# Patient Record
Sex: FEMALE | Race: WHITE | NOT HISPANIC OR LATINO | Employment: FULL TIME | URBAN - METROPOLITAN AREA
[De-identification: names, ages, dates, MRNs, and addresses within clinical notes are randomized per-mention and may not be internally consistent; named-entity substitution may affect disease eponyms.]

---

## 2017-01-24 ENCOUNTER — OFFICE VISIT (OUTPATIENT)
Dept: ORTHOPEDICS | Facility: CLINIC | Age: 37
End: 2017-01-24
Payer: COMMERCIAL

## 2017-01-24 ENCOUNTER — HOSPITAL ENCOUNTER (OUTPATIENT)
Dept: RADIOLOGY | Facility: HOSPITAL | Age: 37
Discharge: HOME | End: 2017-01-24
Attending: ORTHOPAEDIC SURGERY
Payer: COMMERCIAL

## 2017-01-24 VITALS
BODY MASS INDEX: 33.59 KG/M2 | HEART RATE: 88 BPM | DIASTOLIC BLOOD PRESSURE: 56 MMHG | HEIGHT: 67 IN | WEIGHT: 214 LBS | SYSTOLIC BLOOD PRESSURE: 114 MMHG

## 2017-01-24 DIAGNOSIS — M25.572 LEFT ANKLE PAIN, UNSPECIFIED CHRONICITY: ICD-10-CM

## 2017-01-24 DIAGNOSIS — S93.432A SPRAIN OF TIBIOFIBULAR LIGAMENT OF LEFT ANKLE, INITIAL ENCOUNTER: Primary | ICD-10-CM

## 2017-01-24 DIAGNOSIS — M25.572 LEFT ANKLE PAIN, UNSPECIFIED CHRONICITY: Primary | ICD-10-CM

## 2017-01-24 PROCEDURE — 99203 OFFICE O/P NEW LOW 30 MIN: CPT | Mod: S$GLB,,, | Performed by: ORTHOPAEDIC SURGERY

## 2017-01-24 PROCEDURE — 99999 PR PBB SHADOW E&M-EST. PATIENT-LVL III: CPT | Mod: PBBFAC,,, | Performed by: ORTHOPAEDIC SURGERY

## 2017-01-24 PROCEDURE — 73610 X-RAY EXAM OF ANKLE: CPT | Mod: TC,PN,LT

## 2017-01-24 PROCEDURE — 1159F MED LIST DOCD IN RCRD: CPT | Mod: S$GLB,,, | Performed by: ORTHOPAEDIC SURGERY

## 2017-01-24 PROCEDURE — 73610 X-RAY EXAM OF ANKLE: CPT | Mod: 26,LT,, | Performed by: RADIOLOGY

## 2017-01-24 NOTE — PROGRESS NOTES
Past Medical History   Diagnosis Date    Allergy        Past Surgical History   Procedure Laterality Date    Tonsillectomy      Knee arthroscopy Right        Current Outpatient Prescriptions   Medication Sig    naltrexone-bupropion (CONTRAVE) 8-90 mg TbSR 1 tab po q am x1 week, then 1 tab po bid x 1 week, then 2 tabs po q am and 1 po q pm x 1 week, then 2 tabs po bid.    norgestimate-ethinyl estradiol (ORTHO-CYCLEN) 0.25-35 mg-mcg per tablet Take 1 tablet by mouth once daily.     No current facility-administered medications for this visit.        Review of patient's allergies indicates:   Allergen Reactions    Ceclor [cefaclor] Hives       Family History   Problem Relation Age of Onset    Collagen disease Neg Hx     Early death Mother     Heart disease Mother     Hypertension Mother     Hyperlipidemia Mother     Diabetes Mother        Social History     Social History    Marital status: Single     Spouse name: N/A    Number of children: N/A    Years of education: N/A     Occupational History    Not on file.     Social History Main Topics    Smoking status: Current Every Day Smoker    Smokeless tobacco: Never Used    Alcohol use No    Drug use: No    Sexual activity: Not on file     Other Topics Concern    Not on file     Social History Narrative       Chief Complaint:   Chief Complaint   Patient presents with    Ankle Pain     left ankle injury       Consulting Physician: Referral, Self    History of present illness:    This is a 36 y.o. year old female who complains of left ankle pain following a twisting injury after stepping in a hole on 1/21/17.  She reports that her pain is a 5 out of 10 and worse when walking or standing.  She localizes it to the lateral side of her ankle.  She is in normal shoewear today.  She is weightbearing.    Review of Systems:    Constitution: Denies chills, fever, and sweats.  HENT: Denies headaches or blurry vision.  Cardiovascular: Denies chest pain or  "irregular heart beat.  Respiratory: Denies cough or shortness of breath.  Gastrointestinal: Denies abdominal pain, nausea, or vomiting.  Musculoskeletal:  Denies muscle cramps.  Neurological: Denies dizziness or focal weakness.  Psychiatric/Behavioral: Normal mental status.  Hematologic/Lymphatic: Denies bleeding problem or easy bruising/bleeding.  Skin: Denies rash or suspicious lesions.    Examination:    Vital Signs:    Vitals:    01/24/17 1506   BP: (!) 114/56   Pulse: 88   Weight: 97.1 kg (214 lb)   Height: 5' 7" (1.702 m)   PainSc:   5   PainLoc: Ankle       Body mass index is 33.52 kg/(m^2).    This a well-developed, well nourished patient in no acute distress.    Alert and oriented and cooperative to examination.       Physical Exam: Left Ankle Exam     Gait:   Antalgic    Skin  Scars:   None  Rashes:  None    Inspection   Deformity:   None  Erythema:   Mild  Bruising:   Diffuse  Swelling:   Moderate  Lymphadenopathy: None    Instability:  Not tested due to injury    Range of Motion Limited due to pain    Muscle Strength   Strength:  Not tested    Tests   Anterior drawer:  Stable  Varus tilt:  Not tested due to injury    Other   Ankle Crepitus:  Not tested due to injury  Sensation:   Normal  Achilles:  Normal  Forefoot:  Normal  Tenderness:  Medial and lateral malleoli    Vascular Exam   Dorsalis Pedis:       Palpable  Capillary refill:    Normal          Imaging: X-rays ordered and reviewed today of the left ankle reveal no acute bony abnormality, fracture or dislocation.        Assessment: Sprain of tibiofibular ligament of left ankle, initial encounter        Plan:  She has a severe strain of her left ankle.  We'll place her into a boot today and allow her to weight-bear in the boot.  We will also instructed her on icing and elevation.  We like to reevaluate her in 3 weeks with new x-rays.      DISCLAIMER: This note may have been dictated using voice recognition software and may contain grammatical " errors.     NOTE: Consult report sent to referring provider via [employer] EMR.

## 2017-01-24 NOTE — MR AVS SNAPSHOT
[facility]  [address] [place] [id number]  Phone: [phone]                  Lucia Goodwin   2017 3:00 PM   Office Visit    Description:  Female : 1980   Provider:  Oleg Ewing MD   Department:  [facility]           Reason for Visit     Ankle Pain           Diagnoses this Visit        Comments    Sprain of tibiofibular ligament of left ankle, initial encounter    -  Primary            To Do List           Future Appointments        Provider Department Dept Phone    2017 3:15 PM Oleg Ewing MD [facility] [phone]      Goals (5 Years of Data)     None      [facility]     [facility] -  Assistance  Registered nurses in the [facility] Center provide clinical advisement, health education, appointment booking, and other advisory services.  Call for this free service at [phone].             Medications           Message regarding Medications     Verify the changes and/or additions to your medication regime listed below are the same as discussed with your clinician today.  If any of these changes or additions are incorrect, please notify your healthcare provider.             Verify that the below list of medications is an accurate representation of the medications you are currently taking.  If none reported, the list may be blank. If incorrect, please contact your healthcare provider. Carry this list with you in case of emergency.           Current Medications     naltrexone-bupropion (CONTRAVE) 8-90 mg TbSR 1 tab po q am x1 week, then 1 tab po bid x 1 week, then 2 tabs po q am and 1 po q pm x 1 week, then 2 tabs po bid.    norgestimate-ethinyl estradiol (ORTHO-CYCLEN) 0.25-35 mg-mcg per tablet Take 1 tablet by mouth once daily.           Clinical Reference Information           Vital Signs - Last Recorded  Most recent update: 2017  3:28 PM by Krysta Richards  "LPN    BP Pulse Ht Wt BMI    (!) 114/56 88 5' 7" (1.702 m) 97.1 kg (214 lb) 33.52 kg/m2      Blood Pressure          Most Recent Value    BP  (!)  114/56      Allergies as of 1/24/2017     Ceclor [Cefaclor]      Immunizations Administered on Date of Encounter - 1/24/2017     None      Smoking Cessation     If you would like to quit smoking:   You may be eligible for free services if you are a [place] resident and started smoking cigarettes before September 1, 1988.  Call the [employer]oking Cessation [employer] ([employer]) toll free at [phone] or ([phone].   Call 4-209-QUIT-NOW if you do not meet the above criteria.            "

## 2018-03-21 DIAGNOSIS — M25.561 RIGHT KNEE PAIN, UNSPECIFIED CHRONICITY: Primary | ICD-10-CM

## 2025-02-15 ENCOUNTER — HOSPITAL ENCOUNTER (EMERGENCY)
Facility: HOSPITAL | Age: 45
Discharge: HOME | End: 2025-02-15
Attending: EMERGENCY MEDICINE

## 2025-02-15 VITALS
HEART RATE: 70 BPM | DIASTOLIC BLOOD PRESSURE: 70 MMHG | WEIGHT: 214 LBS | BODY MASS INDEX: 33.59 KG/M2 | SYSTOLIC BLOOD PRESSURE: 110 MMHG | OXYGEN SATURATION: 100 % | RESPIRATION RATE: 16 BRPM | HEIGHT: 67 IN | TEMPERATURE: 99 F

## 2025-02-15 DIAGNOSIS — S09.90XA CLOSED HEAD INJURY, INITIAL ENCOUNTER: ICD-10-CM
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
## 2025-02-15 DIAGNOSIS — S00.81XA ABRASION OF FACE, INITIAL ENCOUNTER: ICD-10-CM

## 2025-02-15 DIAGNOSIS — S00.83XA CONTUSION OF FACE, INITIAL ENCOUNTER: ICD-10-CM

## 2025-02-15 LAB
B-HCG UR QL: NEGATIVE
CTP QC/QA: YES

## 2025-02-15 PROCEDURE — 99284 EMERGENCY DEPT VISIT MOD MDM: CPT | Mod: 25

## 2025-02-15 PROCEDURE — 25000003 PHARM REV CODE 250: Performed by: EMERGENCY MEDICINE

## 2025-02-15 PROCEDURE — 81025 URINE PREGNANCY TEST: CPT | Performed by: EMERGENCY MEDICINE

## 2025-02-15 PROCEDURE — 12011 RPR F/E/E/N/L/M 2.5 CM/<: CPT

## 2025-02-15 RX ORDER — ACETAMINOPHEN 500 MG
1000 TABLET ORAL
Status: COMPLETED | OUTPATIENT
Start: 2025-02-15 | End: 2025-02-15

## 2025-02-15 RX ADMIN — ACETAMINOPHEN 1000 MG: 500 TABLET, FILM COATED ORAL at 02:02

## 2025-02-15 NOTE — ED PROVIDER NOTES
Encounter Date: 2/15/2025       History     Chief Complaint   Patient presents with    Fall     Fell down approx 5 steps on [employer] and hit head. -LOC. +ETOH.     Head Injury     HPI  Patient presents to ED by ambulance after a fall while riding on [employer] this evening.  + ETOH.  Patient was on the upper deck and coming down the steps to the lower level.  States she has a bad ankle from old injury that was surgically repaired and the ankle sometimes gives out, which is what happened as she was coming down the steps and then she fell forward striking her head/face.  She denies loss of consciousness.  She complains of pain to her head and face.  She denies any other associated injuries or recent illness.   Review of patient's allergies indicates:   Allergen Reactions    Ceclor [cefaclor] Hives     Past Medical History:   Diagnosis Date    Allergy      Past Surgical History:   Procedure Laterality Date    KNEE ARTHROSCOPY Right     TONSILLECTOMY       Family History   Problem Relation Name Age of Onset    Collagen disease Neg Hx      Early death Mother      Heart disease Mother      Hypertension Mother      Hyperlipidemia Mother      Diabetes Mother       Social History[1]  Review of Systems   Constitutional:  Negative for fever.   HENT:  Negative for sore throat.    Respiratory:  Negative for shortness of breath.    Cardiovascular:  Negative for chest pain.   Gastrointestinal:  Negative for nausea.   Genitourinary:  Negative for dysuria.   Musculoskeletal:  Negative for back pain.   Skin:  Positive for wound. Negative for rash.   Neurological:  Positive for headaches. Negative for weakness.   Hematological:  Does not bruise/bleed easily.       Physical Exam     Initial Vitals [02/15/25 0046]   BP Pulse Resp Temp SpO2   115/67 80 18 98.5 °F (36.9 °C) 100 %      MAP       --         Physical Exam    Nursing note and vitals reviewed.  Constitutional: She appears well-developed and well-nourished. No  distress.   HENT:   Head: Normocephalic. Mouth/Throat: Oropharynx is clear and moist.   Abrasion to the central portion of the forehead at the hairline.  Multiple facial contusions noted to the forehead as well as the right maxillary region.  There are abrasions to the right face with a superficial linear laceration to the cheek.   Eyes: EOM are normal. Pupils are equal, round, and reactive to light.   Neck:   Collar in place.  No palpable midline tenderness or deformities.   Cardiovascular:  Normal rate and regular rhythm.           Pulmonary/Chest: Breath sounds normal. No respiratory distress. She exhibits no tenderness.   Abdominal: Abdomen is soft. There is no abdominal tenderness. There is no rebound and no guarding.   Musculoskeletal:         General: No tenderness or edema. Normal range of motion.     Neurological: She is alert and oriented to person, place, and time. She has normal strength. No cranial nerve deficit. GCS score is 15. GCS eye subscore is 4. GCS verbal subscore is 5. GCS motor subscore is 6.   Skin: Skin is warm and dry. Capillary refill takes less than 2 seconds. No rash noted.   Psychiatric: She has a normal mood and affect. Thought content normal.         ED Course   Lac Repair    Date/Time: 2/15/2025 3:00 AM    Performed by: Maryjo Portillo MD  Authorized by: Maryjo Portillo MD    Consent:     Consent obtained:  Verbal    Consent given by:  Patient    Risks, benefits, and alternatives were discussed: yes      Risks discussed:  Poor cosmetic result, infection and need for additional repair    Alternatives discussed:  No treatment  Anesthesia:     Anesthesia method:  None  Laceration details:     Location:  Face    Face location:  R cheek    Length (cm):  1  Exploration:     Limited defect created (wound extended): no      Contaminated: no    Treatment:     Area cleansed with:  Saline    Amount of cleaning:  Standard    Irrigation solution:  Sterile saline    Visualized foreign  bodies/material removed: no      Debridement:  None    Undermining:  None    Scar revision: no    Skin repair:     Repair method:  Tissue adhesive  Post-procedure details:     Dressing:  Open (no dressing)    Procedure completion:  Tolerated well, no immediate complications    Labs Reviewed   POCT URINE PREGNANCY       Result Value    POC Preg Test, Ur Negative       Acceptable Yes            Imaging Results              CT Maxillofacial Without Contrast (Final result)  Result time 02/15/25 02:22:06      Final result by Gustavo Cheema MD (02/15/25 02:22:06)                   Impression:      Intact maxillofacial skeleton.    Soft tissue findings above.      Electronically signed by: Gustavo Cheema  Date:    02/15/2025  Time:    02:22               Narrative:    EXAMINATION:  CT MAXILLOFACIAL WITHOUT CONTRAST    CLINICAL HISTORY:  Pt fell down the stairs of a parade float;    TECHNIQUE:  Low dose axial images, sagittal and coronal reformations were obtained through the face.  Contrast was not administered.    COMPARISON:  None    FINDINGS:  Moderate size right periorbital/pre maxillary subcutaneous contusion.    Intact maxillofacial skeleton.    Paranasal sinuses are essentially clear.    Globes are intact                                       CT Cervical Spine Without Contrast (Final result)  Result time 02/15/25 02:32:51      Final result by Gustavo Cheema MD (02/15/25 02:32:51)                   Impression:      No acute trauma.      Electronically signed by: Gustavo Cheema  Date:    02/15/2025  Time:    02:32               Narrative:    EXAMINATION:  CT CERVICAL SPINE WITHOUT CONTRAST    CLINICAL HISTORY:  Neck trauma, dangerous injury mechanism (Age 16-64y);    TECHNIQUE:  Low dose axial images, sagittal and coronal reformations were performed though the cervical spine.  Contrast was not administered.    COMPARISON:  None    FINDINGS:  Straightened cervical lordosis.  No  acute fracture or listhesis. Intervertebral disc spaces and vertebral body heights are maintained. No significant spondylosis.  No central canal or foraminal stenosis.  Unremarkable prevertebral soft tissues.                                       CT Head Without Contrast (Final result)  Result time 02/15/25 02:14:18      Final result by Gustavo Cheema MD (02/15/25 02:14:18)                   Impression:      No acute intracranial abnormality.      Electronically signed by: Gustavo Cheema  Date:    02/15/2025  Time:    02:14               Narrative:    EXAMINATION:  CT HEAD WITHOUT CONTRAST    CLINICAL HISTORY:  Head trauma, abnormal mental status (Age 19-64y);    TECHNIQUE:  Low dose axial images were obtained through the head.  Coronal and sagittal reformations were also performed. Contrast was not administered.    COMPARISON:  None.    FINDINGS:  Intracranial compartment:    Ventricles and sulci are normal in size for age without evidence of hydrocephalus. No extra-axial blood or fluid collections.    The brain parenchyma appears normal. No parenchymal mass, hemorrhage, edema or major vascular distribution infarct.    Skull/extracranial contents (limited evaluation): No fracture. Mastoid air cells and paranasal sinuses are essentially clear.                                       Medications   acetaminophen tablet 1,000 mg (1,000 mg Oral Given 2/15/25 0229)     Medical Decision Making  Amount and/or Complexity of Data Reviewed  Labs: ordered.    Risk  OTC drugs.                   Patient presents to ED as above.  Vitals stable.  Differential includes fracture, contusion, ICH.  CT of the head, maxillofacial, cervical spine obtained and per radiology read there are no acute findings.  Patient given Tylenol for pain.  Dermabond applied to the superficial cheek laceration.  Discussed supportive care recommendations with she and her sister.  She verbalizes understanding.  She appears clinically sober and is  able to ambulate unassisted at this time.  Appears stable for discharge.                 Clinical Impression:  Final diagnoses:  [W19.XXXA] Fall, initial encounter (Primary)  [S09.90XA] Closed head injury, initial encounter  [S00.83XA] Contusion of face, initial encounter  [S00.81XA] Abrasion of face, initial encounter          ED Disposition Condition    Discharge Stable          ED Prescriptions    None       Follow-up Information       Follow up With Specialties Details Why Contact Info Additional Information    [facility] - Emergency Dept Emergency Medicine  As needed, If symptoms worsen [address] [facility] [address]  [phone] 1st floor                 [1]   Social History  Tobacco Use    Smoking status: Every Day    Smokeless tobacco: Never   Substance Use Topics    Alcohol use: No    Drug use: No        Maryjo Portillo MD  02/15/25 0818